# Patient Record
Sex: FEMALE | Race: WHITE | NOT HISPANIC OR LATINO | Employment: PART TIME | URBAN - METROPOLITAN AREA
[De-identification: names, ages, dates, MRNs, and addresses within clinical notes are randomized per-mention and may not be internally consistent; named-entity substitution may affect disease eponyms.]

---

## 2023-01-05 ENCOUNTER — OFFICE VISIT (OUTPATIENT)
Dept: URGENT CARE | Facility: CLINIC | Age: 23
End: 2023-01-05

## 2023-01-05 VITALS
HEIGHT: 62 IN | DIASTOLIC BLOOD PRESSURE: 81 MMHG | HEART RATE: 87 BPM | BODY MASS INDEX: 33.71 KG/M2 | SYSTOLIC BLOOD PRESSURE: 138 MMHG | OXYGEN SATURATION: 100 % | WEIGHT: 183.2 LBS | TEMPERATURE: 97.9 F

## 2023-01-05 DIAGNOSIS — R21 RASH: ICD-10-CM

## 2023-01-05 DIAGNOSIS — L25.9 CONTACT DERMATITIS, UNSPECIFIED CONTACT DERMATITIS TYPE, UNSPECIFIED TRIGGER: Primary | ICD-10-CM

## 2023-01-05 RX ORDER — PREDNISONE 10 MG/1
TABLET ORAL
Qty: 10 TABLET | Refills: 0 | Status: SHIPPED | OUTPATIENT
Start: 2023-01-05 | End: 2023-01-09

## 2023-01-05 NOTE — PATIENT INSTRUCTIONS
Take all prednisone as prescribed  Do not take any other anti-inflammatories such as Motrin, Aleve, ibuprofen with the prednisone/steroids  OTC symptomatic treatment for itching if needed  Call PCP to schedule a follow-up appt in the next 1-2 days for reevaluation and to ensure resolution of symptoms  Go to the nearest ER for evaluation if any fevers, redness, warmth, discharge, streaking redness, redness that is circumferential, bleeding, pain, signs of infection, new or worsening symptoms, facial/tongue/lip swelling, difficulty breathing or swallowing, or other concerning symptoms

## 2024-10-30 NOTE — PROGRESS NOTES
[facility] [facility]        NAME: Lanie Modi is a 23 y o  female  : 2000    MRN: [id number]  DATE: 2023  TIME: 5:14 PM    Assessment and Plan   Contact dermatitis, unspecified contact dermatitis type, unspecified trigger [L25 9]  1  Contact dermatitis, unspecified contact dermatitis type, unspecified trigger  predniSONE 10 mg tablet      2  Rash              Patient Instructions     Take all prednisone as prescribed  Do not take any other anti-inflammatories such as Motrin, Aleve, ibuprofen with the prednisone/steroids  OTC symptomatic treatment for itching if needed  Call PCP to schedule a follow-up appt in the next 1-2 days for reevaluation and to ensure resolution of symptoms  Go to the nearest ER for evaluation if any fevers, redness, warmth, discharge, streaking redness, redness that is circumferential, bleeding, pain, signs of infection, new or worsening symptoms, facial/tongue/lip swelling, difficulty breathing or swallowing, or other concerning symptoms  Chief Complaint     Chief Complaint   Patient presents with   • Rash     ON both arms, patient noticed the rash on 23 which was only on her hands, as the days been progressing it has spread up the arm, patient explain it stops about the bicep region of her arms  Bilateral rash  History of Present Illness       22 y/o female presents with rash to bilateral arms x 4 days  States was doing some cleaning and thinks her hands got dry from the cleaning products  States she tried a lot of new lotions and then noticed the rash to the back of her hands  States it is itchy  Has tried hydrocortisone cream and benadryl with some improvement  States the rash started to get better but then came back on the back of hands and forearms  Denies any cough, fevers or cold like symptoms  Denies any bug bites, new foods, medications or other inciting factors  Denies any other rashes or lesions   Denies any swelling, difficulty breathing or swallowing or other complaints  Denies any pregnancy risk  Review of Systems   Review of Systems   Constitutional: Negative for chills, fatigue and fever  HENT: Negative for facial swelling, sore throat, trouble swallowing and voice change  Respiratory: Negative for cough and shortness of breath  Cardiovascular: Negative for chest pain  Gastrointestinal: Negative for abdominal pain, diarrhea, nausea and vomiting  Skin: Positive for rash  Neurological: Negative for dizziness, weakness, numbness and headaches  All other systems reviewed and are negative  Current Medications       Current Outpatient Medications:   •  predniSONE 10 mg tablet, Take 4 tablets (40 mg total) by mouth daily for 1 day, THEN 3 tablets (30 mg total) daily for 1 day, THEN 2 tablets (20 mg total) daily for 1 day, THEN 1 tablet (10 mg total) daily for 1 day , Disp: 10 tablet, Rfl: 0    Current Allergies     Allergies as of 01/05/2023 - never reviewed   Allergen Reaction Noted   • Penicillin g benzathine Hives 01/05/2023            The following portions of the patient's history were reviewed and updated as appropriate: allergies, current medications, past family history, past medical history, past social history, past surgical history and problem list      History reviewed  No pertinent past medical history  History reviewed  No pertinent surgical history  Family History   Problem Relation Age of Onset   • Heart disease Mother    • No Known Problems Father    • Diabetes Maternal Grandfather    • Heart disease Paternal Grandfather    • Diabetes Paternal Grandfather          Medications have been verified  Objective   /81   Pulse 87   Temp 97 9 °F (36 6 °C)   Ht 5' 2" (1 575 m)   Wt 83 1 kg (183 lb 3 2 oz)   SpO2 100%   BMI 33 51 kg/m²        Physical Exam     Physical Exam  Vitals and nursing note reviewed  Constitutional:       General: She is not in acute distress  Appearance: Normal appearance  She is not ill-appearing or toxic-appearing  HENT:      Mouth/Throat:      Mouth: Mucous membranes are moist  No oral lesions or angioedema  Pharynx: Oropharynx is clear  Uvula midline  No pharyngeal swelling or uvula swelling  Cardiovascular:      Rate and Rhythm: Normal rate and regular rhythm  Heart sounds: Normal heart sounds  Pulmonary:      Effort: Pulmonary effort is normal  No respiratory distress  Breath sounds: Normal breath sounds  No wheezing  Skin:     Capillary Refill: Capillary refill takes less than 2 seconds  Findings: Rash (diffusely scattered blanchable erythematous rash to back of bilateral hands and forearms  no warmth, drainage, swelling  NTTP  No signs of secondary infection or cellulitis   ) present  Comments: no rash on palms or mucous membranes  no pustules, ulcerations, lesions or other rashes visualized  Neurological:      General: No focal deficit present  Mental Status: She is alert and oriented to person, place, and time  70